# Patient Record
(demographics unavailable — no encounter records)

---

## 2024-11-07 NOTE — PHYSICAL EXAM
[No Acute Distress] : no acute distress [Normal Oropharynx] : normal oropharynx [Normal Appearance] : normal appearance [Normal Rate/Rhythm] : normal rate/rhythm [Normal S1, S2] : normal s1, s2 [No Resp Distress] : no resp distress [Normal Rhythm and Effort] : normal rhythm and effort [Clear to Auscultation Bilaterally] : clear to auscultation bilaterally [No Abnormalities] : no abnormalities [Normal Gait] : normal gait [No Clubbing] : no clubbing [No Edema] : no edema [Normal Color/ Pigmentation] : normal color/ pigmentation [No Focal Deficits] : no focal deficits [Oriented x3] : oriented x3 [Normal Affect] : normal affect [TextBox_68] : overall diminished BS but clear bilaterally

## 2024-11-07 NOTE — ASSESSMENT
[FreeTextEntry1] : ~age~yo woman former 50py smoker w/ asthma/COPD overlap and emphysema on LTOT 2L, bronchiectasis, h/o strongyloides and multiple hospitalizations for PNA, presenting to re-establish pulmonary follow-up.  Data Reviewed: PSG (5/2024) - mild SDB, no SALMA or Cheyne-Yi pattern CT Chest w/o (PACS, 2/2024) - emphysema; RLL consolidations, worse from prior study 10/2023, improved b/l upper lobe linear opacities PFT (10/2023) - normal spirometry, normal volumes, mildly decreased DLCO 6MWT (10/2023) - 75m, pre-brijesh 3, post-brijesh 7, required 2L O2 during 6MWT (unspecified desaturation)  Impression: #Asthma w/ COPD and emphysema #Chronic hypoxemic respiratory failure on LTOT 2-4LPM  #Bronchiectasis #H/o PNA  Plan: - cont albuterol MDI 1-2 puffs q4-6h PRN vs. nebulizer; rx renewed - cont Trelegy Ellipta 1 puff daily with mouth rinsing; rx renewed - cont Daliresp rx renewed - in light of her variable supplemental O2 flow needs (2-4LPM) and ambiguous exertional requirements, will refer for high-altitude simulation testing (HAST) to better ascertain if it is safe/appropriate for her to fly to [facility] as desired - due for annual PFT and 6MWT, will have her obtain with HAST testing - check surveillance CT chest w/o contrast, overdue to ensure resolution of previously visualized opacities from 2/2024 in high risk pt; she will get done at [facility] after insurance auth - suggested she resume using InCourage chest vest at least 1-2x daily for pulmonary hygiene - updated pt's daughter by telephone during visit, at patient's request  RTO 3-6mos, or sooner if needed

## 2024-11-07 NOTE — REASON FOR VISIT
[Initial] : an initial visit [Asthma] : asthma [COPD] : COPD [Emphysema] : emphysema [Other: ______] : provided by [facility] [Interpreters_IDNumber] : [id number] [Interpreters_FullName] : Laron [TWNoteComboBox1] : [place]

## 2024-11-07 NOTE — HISTORY OF PRESENT ILLNESS
[Former] : former [>= 20 pack years] : >= 20 pack years [TextBox_4] : ~age~yo woman former 50py smoker w/ asthma/COPD overlap and emphysema on LTOT 2-4LPM, bronchiectasis, h/o strongyloides presenting to re-establish pulmonary follow-up.  Previous pt of Dr. Hankins, last seen 6/2024 -- on Trelegy, Daliresp, PRN albuterol and was also given InCourage Chest Vest.   Feels well today and says doing better than she had been in the past given her history. Doing Trelegy and albuterol inhaled tx, was doing Daliresp but ran out; needs refills on all meds. Hasn't been using chest vest, mostly because she has a lot of meds and things to do and it slips her mind, knows she should and that it's helpful. Using LTOT, usually 2LPM but sometimes increases to 3-5LPM when she feels like she needs it or on significant exertion. Wants to know if her PNA is still in the lungs - denies F/C, new or worsening productive cough, or changes in baseline dyspnea. She also would like to travel to [facility] and wants to know if it is possible with her lung disease. She has not yet traveled by plane since starting LTOT.   SH: Former 50py smoker, quit 2016 [TextBox_11] : 1 [TextBox_13] : 50 [YearQuit] : 2016

## 2024-11-07 NOTE — REVIEW OF SYSTEMS
[Cough] : cough [SOB on Exertion] : sob on exertion [Negative] : Endocrine [Hemoptysis] : no hemoptysis [Chest Tightness] : no chest tightness [Sputum] : no sputum [Pleuritic Pain] : no pleuritic pain [Wheezing] : no wheezing